# Patient Record
Sex: FEMALE | Race: WHITE | NOT HISPANIC OR LATINO | Employment: FULL TIME | URBAN - METROPOLITAN AREA
[De-identification: names, ages, dates, MRNs, and addresses within clinical notes are randomized per-mention and may not be internally consistent; named-entity substitution may affect disease eponyms.]

---

## 2023-04-19 LAB
ALANINE AMINOTRANSFERASE (SGPT) (U/L) IN SER/PLAS: 19 U/L (ref 7–45)
ALBUMIN (G/DL) IN SER/PLAS: 3.8 G/DL (ref 3.4–5)
ALKALINE PHOSPHATASE (U/L) IN SER/PLAS: 81 U/L (ref 33–136)
ANION GAP IN SER/PLAS: 13 MMOL/L (ref 10–20)
ASPARTATE AMINOTRANSFERASE (SGOT) (U/L) IN SER/PLAS: 23 U/L (ref 9–39)
BASOPHILS (10*3/UL) IN BLOOD BY AUTOMATED COUNT: 0.09 X10E9/L (ref 0–0.1)
BASOPHILS/100 LEUKOCYTES IN BLOOD BY AUTOMATED COUNT: 0.9 % (ref 0–2)
BILIRUBIN TOTAL (MG/DL) IN SER/PLAS: 0.5 MG/DL (ref 0–1.2)
C REACTIVE PROTEIN (MG/L) IN SER/PLAS: 5.19 MG/DL
CALCIUM (MG/DL) IN SER/PLAS: 9.7 MG/DL (ref 8.6–10.3)
CARBON DIOXIDE, TOTAL (MMOL/L) IN SER/PLAS: 26 MMOL/L (ref 21–32)
CHLORIDE (MMOL/L) IN SER/PLAS: 104 MMOL/L (ref 98–107)
CREATININE (MG/DL) IN SER/PLAS: 1.03 MG/DL (ref 0.5–1.05)
EOSINOPHILS (10*3/UL) IN BLOOD BY AUTOMATED COUNT: 0.31 X10E9/L (ref 0–0.7)
EOSINOPHILS/100 LEUKOCYTES IN BLOOD BY AUTOMATED COUNT: 3.2 % (ref 0–6)
ERYTHROCYTE DISTRIBUTION WIDTH (RATIO) BY AUTOMATED COUNT: 12.5 % (ref 11.5–14.5)
ERYTHROCYTE MEAN CORPUSCULAR HEMOGLOBIN CONCENTRATION (G/DL) BY AUTOMATED: 31.6 G/DL (ref 32–36)
ERYTHROCYTE MEAN CORPUSCULAR VOLUME (FL) BY AUTOMATED COUNT: 99 FL (ref 80–100)
ERYTHROCYTES (10*6/UL) IN BLOOD BY AUTOMATED COUNT: 3.76 X10E12/L (ref 4–5.2)
GFR FEMALE: 60 ML/MIN/1.73M2
GLUCOSE (MG/DL) IN SER/PLAS: 127 MG/DL (ref 74–99)
HEMATOCRIT (%) IN BLOOD BY AUTOMATED COUNT: 37.4 % (ref 36–46)
HEMOGLOBIN (G/DL) IN BLOOD: 11.8 G/DL (ref 12–16)
IMMATURE GRANULOCYTES/100 LEUKOCYTES IN BLOOD BY AUTOMATED COUNT: 0.7 % (ref 0–0.9)
LEUKOCYTES (10*3/UL) IN BLOOD BY AUTOMATED COUNT: 9.7 X10E9/L (ref 4.4–11.3)
LYMPHOCYTES (10*3/UL) IN BLOOD BY AUTOMATED COUNT: 2.81 X10E9/L (ref 1.2–4.8)
LYMPHOCYTES/100 LEUKOCYTES IN BLOOD BY AUTOMATED COUNT: 28.9 % (ref 13–44)
MONOCYTES (10*3/UL) IN BLOOD BY AUTOMATED COUNT: 0.95 X10E9/L (ref 0.1–1)
MONOCYTES/100 LEUKOCYTES IN BLOOD BY AUTOMATED COUNT: 9.8 % (ref 2–10)
NEUTROPHILS (10*3/UL) IN BLOOD BY AUTOMATED COUNT: 5.48 X10E9/L (ref 1.2–7.7)
NEUTROPHILS/100 LEUKOCYTES IN BLOOD BY AUTOMATED COUNT: 56.5 % (ref 40–80)
PLATELETS (10*3/UL) IN BLOOD AUTOMATED COUNT: 400 X10E9/L (ref 150–450)
POTASSIUM (MMOL/L) IN SER/PLAS: 4.1 MMOL/L (ref 3.5–5.3)
PROTEIN TOTAL: 7 G/DL (ref 6.4–8.2)
SODIUM (MMOL/L) IN SER/PLAS: 139 MMOL/L (ref 136–145)
UREA NITROGEN (MG/DL) IN SER/PLAS: 18 MG/DL (ref 6–23)

## 2024-04-26 ENCOUNTER — OFFICE VISIT (OUTPATIENT)
Dept: RHEUMATOLOGY | Facility: CLINIC | Age: 69
End: 2024-04-26
Payer: COMMERCIAL

## 2024-04-26 VITALS
HEIGHT: 67 IN | OXYGEN SATURATION: 98 % | WEIGHT: 136.4 LBS | DIASTOLIC BLOOD PRESSURE: 68 MMHG | SYSTOLIC BLOOD PRESSURE: 118 MMHG | BODY MASS INDEX: 21.41 KG/M2 | HEART RATE: 76 BPM | TEMPERATURE: 98.3 F

## 2024-04-26 DIAGNOSIS — M05.79 RHEUMATOID ARTHRITIS INVOLVING MULTIPLE SITES WITH POSITIVE RHEUMATOID FACTOR (MULTI): Primary | ICD-10-CM

## 2024-04-26 PROBLEM — H43.813 BILATERAL VITREOUS DETACHMENT: Status: ACTIVE | Noted: 2024-04-26

## 2024-04-26 PROBLEM — B35.1 ONYCHOMYCOSIS: Status: ACTIVE | Noted: 2020-09-16

## 2024-04-26 PROBLEM — S39.012A LUMBAR STRAIN: Status: ACTIVE | Noted: 2024-04-26

## 2024-04-26 PROBLEM — R92.2 DENSE BREASTS: Status: ACTIVE | Noted: 2024-04-26

## 2024-04-26 PROBLEM — C25.0 MALIGNANT NEOPLASM OF HEAD OF PANCREAS (MULTI): Status: ACTIVE | Noted: 2023-05-26

## 2024-04-26 PROBLEM — Z86.0100 HISTORY OF COLON POLYPS: Status: ACTIVE | Noted: 2024-04-26

## 2024-04-26 PROBLEM — R43.2 ALTERED TASTE: Status: ACTIVE | Noted: 2023-09-06

## 2024-04-26 PROBLEM — Z90.79 H/O BILATERAL SALPINGECTOMY: Status: ACTIVE | Noted: 2018-02-05

## 2024-04-26 PROBLEM — Z86.010 HISTORY OF COLON POLYPS: Status: ACTIVE | Noted: 2024-04-26

## 2024-04-26 PROBLEM — E08.9 DIABETES MELLITUS DUE TO UNDERLYING CONDITION WITHOUT COMPLICATION, WITHOUT LONG-TERM CURRENT USE OF INSULIN (MULTI): Status: ACTIVE | Noted: 2024-01-02

## 2024-04-26 PROBLEM — G62.9 NEUROPATHY: Status: ACTIVE | Noted: 2023-09-06

## 2024-04-26 PROBLEM — J06.9 RECURRENT URI (UPPER RESPIRATORY INFECTION): Status: ACTIVE | Noted: 2024-04-26

## 2024-04-26 PROBLEM — R50.9 FEVER: Status: ACTIVE | Noted: 2024-04-26

## 2024-04-26 PROBLEM — G62.0 CHEMOTHERAPY-INDUCED NEUROPATHY (MULTI): Status: ACTIVE | Noted: 2024-04-23

## 2024-04-26 PROBLEM — Z90.710 S/P ABDOMINAL HYSTERECTOMY: Status: ACTIVE | Noted: 2018-12-17

## 2024-04-26 PROBLEM — D64.9 NORMOCYTIC NORMOCHROMIC ANEMIA: Status: ACTIVE | Noted: 2023-09-06

## 2024-04-26 PROBLEM — R05.9 COUGH: Status: ACTIVE | Noted: 2024-04-26

## 2024-04-26 PROBLEM — E55.9 VITAMIN D DEFICIENCY: Status: ACTIVE | Noted: 2024-04-26

## 2024-04-26 PROBLEM — D72.821 MONOCYTOSIS: Status: ACTIVE | Noted: 2023-09-06

## 2024-04-26 PROBLEM — R74.8 ALKALINE PHOSPHATASE ELEVATION: Status: ACTIVE | Noted: 2023-09-06

## 2024-04-26 PROBLEM — R92.30 DENSE BREASTS: Status: ACTIVE | Noted: 2024-04-26

## 2024-04-26 PROBLEM — G43.109 MIGRAINE WITH AURA, NOT INTRACTABLE, WITHOUT STATUS MIGRAINOSUS: Status: ACTIVE | Noted: 2024-04-26

## 2024-04-26 PROBLEM — H35.81 MACULAR EDEMA OF LEFT EYE: Status: ACTIVE | Noted: 2024-04-26

## 2024-04-26 PROBLEM — J22 LOWER RESPIRATORY INFECTION (E.G., BRONCHITIS, PNEUMONIA, PNEUMONITIS, PULMONITIS): Status: ACTIVE | Noted: 2024-04-26

## 2024-04-26 PROBLEM — K21.9 GERD (GASTROESOPHAGEAL REFLUX DISEASE): Status: ACTIVE | Noted: 2020-09-16

## 2024-04-26 PROBLEM — H35.372 EPIRETINAL MEMBRANE (ERM) OF LEFT EYE: Status: ACTIVE | Noted: 2024-04-26

## 2024-04-26 PROBLEM — Z90.49 HISTORY OF WHIPPLE PROCEDURE: Status: ACTIVE | Noted: 2023-10-24

## 2024-04-26 PROBLEM — I10 ESSENTIAL HYPERTENSION: Status: ACTIVE | Noted: 2024-04-26

## 2024-04-26 PROBLEM — Z90.410 HISTORY OF WHIPPLE PROCEDURE: Status: ACTIVE | Noted: 2023-10-24

## 2024-04-26 PROBLEM — T45.1X5A CHEMOTHERAPY-INDUCED NEUROPATHY (MULTI): Status: ACTIVE | Noted: 2024-04-23

## 2024-04-26 PROBLEM — E78.5 DYSLIPIDEMIA: Status: ACTIVE | Noted: 2024-04-26

## 2024-04-26 PROBLEM — N28.9 ACUTE RENAL INSUFFICIENCY: Status: ACTIVE | Noted: 2024-04-26

## 2024-04-26 PROBLEM — M85.80 OSTEOPENIA, SENILE: Status: ACTIVE | Noted: 2022-10-04

## 2024-04-26 PROCEDURE — 99214 OFFICE O/P EST MOD 30 MIN: CPT | Performed by: INTERNAL MEDICINE

## 2024-04-26 PROCEDURE — 3078F DIAST BP <80 MM HG: CPT | Performed by: INTERNAL MEDICINE

## 2024-04-26 PROCEDURE — 1159F MED LIST DOCD IN RCRD: CPT | Performed by: INTERNAL MEDICINE

## 2024-04-26 PROCEDURE — 1160F RVW MEDS BY RX/DR IN RCRD: CPT | Performed by: INTERNAL MEDICINE

## 2024-04-26 PROCEDURE — 3074F SYST BP LT 130 MM HG: CPT | Performed by: INTERNAL MEDICINE

## 2024-04-26 PROCEDURE — 1036F TOBACCO NON-USER: CPT | Performed by: INTERNAL MEDICINE

## 2024-04-26 PROCEDURE — 3008F BODY MASS INDEX DOCD: CPT | Performed by: INTERNAL MEDICINE

## 2024-04-26 PROCEDURE — 1124F ACP DISCUSS-NO DSCNMKR DOCD: CPT | Performed by: INTERNAL MEDICINE

## 2024-04-26 RX ORDER — ACETAMINOPHEN 500 MG
500 TABLET ORAL EVERY 6 HOURS PRN
COMMUNITY

## 2024-04-26 RX ORDER — CICLOPIROX 7.7 MG/G
1 GEL TOPICAL
COMMUNITY

## 2024-04-26 RX ORDER — CARBOXYMETHYLCELLULOSE SODIUM 5 MG/ML
1 SOLUTION/ DROPS OPHTHALMIC AS NEEDED
COMMUNITY

## 2024-04-26 RX ORDER — OMEPRAZOLE 20 MG/1
20 CAPSULE, DELAYED RELEASE ORAL
COMMUNITY
Start: 2024-04-10

## 2024-04-26 RX ORDER — CYCLOSPORINE 0.5 MG/ML
1 EMULSION OPHTHALMIC EVERY 12 HOURS
COMMUNITY
Start: 2012-11-28

## 2024-04-26 RX ORDER — PANCRELIPASE 36000; 180000; 114000 [USP'U]/1; [USP'U]/1; [USP'U]/1
1 CAPSULE, DELAYED RELEASE PELLETS ORAL
COMMUNITY

## 2024-04-26 RX ORDER — ACETAMINOPHEN 500 MG
2000 TABLET ORAL
COMMUNITY
Start: 2024-04-23

## 2024-04-26 RX ORDER — FLUTICASONE PROPIONATE 50 MCG
1 SPRAY, SUSPENSION (ML) NASAL DAILY
COMMUNITY

## 2024-04-26 RX ORDER — HYDROXYCHLOROQUINE SULFATE 200 MG/1
300 TABLET, FILM COATED ORAL DAILY
Qty: 45 TABLET | Refills: 5 | Status: SHIPPED | OUTPATIENT
Start: 2024-04-26

## 2024-04-26 RX ORDER — AMLODIPINE BESYLATE 2.5 MG/1
2.5 TABLET ORAL DAILY
COMMUNITY
Start: 2024-01-02

## 2024-04-26 ASSESSMENT — PATIENT HEALTH QUESTIONNAIRE - PHQ9
1. LITTLE INTEREST OR PLEASURE IN DOING THINGS: NOT AT ALL
SUM OF ALL RESPONSES TO PHQ9 QUESTIONS 1 AND 2: 0
2. FEELING DOWN, DEPRESSED OR HOPELESS: NOT AT ALL

## 2024-04-26 NOTE — PATIENT INSTRUCTIONS
Recommend hydroxychloroquine 300 mg daily.  Discussed risk of retinopathy and need for periodic eye exams.  We will send a message to Dr. Mak to make sure he does not think hydroxychloroquine is contraindicated.  Do not start the medication until you hear back from us.  Follow-up in 3 months.

## 2024-04-26 NOTE — PROGRESS NOTES
Subjective   Patient ID: Beatris Ponce is a 68 y.o. female who presents for Follow-up re: RA. (Onset of symptoms was 2006-rheumatoid factor negative, Citrulline antibody 41, EMA 1:160 with negative panel), CRP 1.8, ESR 32,    HPI 67 yo F here for f/u re: RA. (Onset of symptoms was 2006-rheumatoid factor negative, Citrulline antibody 41, EMA 1:160 with negative panel), CRP 1.8, ESR 32,  She was last seen by me April 2023.    She was diagnosed with adenocarcinoma of the pancreas June 2023. She is followed by Dr. Davidson in oncology.  TREATMENT SUMMARY:  1-Neoadjuvant FOLFIRINOX x 3 months: completed 9/8/2023   2-Whipple procedure (Dr Clements, 10/6/2023), yT2N1  3-Adjuvant FOLFIRINOX: started 11/29/2023, completed 2/9/2024  4-Current treatment: surveillance    She is currently working from home.  Next CT scan is scheduled for June 2024.    She continues with Restasis, refresh drops, and refresh gel at bedtime for treatment of dry eyes.  She has been off methotrexate since June 2024.    She had eye exam 6/22 (Dr. Padgett and Dr. Mak- retina). 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  She has been getting seen every 3 months.    She had recent episodic joint pain, although she has otherwise been symptom-free since she was getting chemotherapy for pancreatic cancer.  She had a flare about 2 weeks ago, at which time her wrists, left shoulder, knees and ankles were sore and stiff.  This resolved with taking Tylenol for 2 days.  She relates that 2 days ago, she is having pain in her left elbow, right third and fourth MCP joints.  This is now feeling better as well.    She had a colonoscopy last 10/21, was found to have 4 polyps. 3-year follow-up was recommended.  Her maternal uncle had colon cancer.    DEXA 7/22: T score -1.2 left total hip, T score -1.2 left femoral neck, lumbar spine T score normal    Labs September 2022: Fasting glucose 104, cholesterol 170, HDL 46, , triglycerides 91  Labs October 2022:  "CBC normal, CMP normal except glucose 116, CRP 0.57 (normal less than 1),   Labs 1/23: CMP normal except creatinine 1.25 (GFR 48) and glucose 140, CBC normal, CRP 0.56 (less than 1)  Labs April 2023: CMP normal except glucose 127, CBC normal except hemoglobin 11.8, CRP 5.19 (normal less than 1)  Labs April 2024: CMP normal except alkaline phosphatase 534 (), AST 84 (13-35), ALT 94 (7-38), CBC normal, CA 19-9 normal, magnesium 1.9, urine microalbumin negative    Health maintenance:   -Prevnar 20 October 2022   -Pfizer COVID vaccine 3/21, 4/21, 8/21, 4/22   - BV Pfizer COVID booster 10/22    Review of Systems  General: Denies fevers or chills.  CV: Denies chest pain or palpitations.  Denies leg edema.  Lungs: Denies coughing or shortness of breath.  Skin: Denies rashes or nodules.  MS: c/o episodic joint pain as seen in [place].    Objective   Visit Vitals  /68 (BP Location: Left arm, Patient Position: Sitting, BP Cuff Size: Small adult)   Pulse 76   Temp 36.8 °C (98.3 °F)   Ht 1.695 m (5' 6.75\")   Wt 61.9 kg (136 lb 6.4 oz)   SpO2 98%   BMI 21.52 kg/m²   Smoking Status Never   BSA 1.71 m²        Physical Exam  HEENT: PERRL, EOMI  Neck: Supple, no nodes.  CV: RRR, no MGR.  Lungs: Clear, no rales or wheezes.  Abdomen: Soft, nontender. No hepatosplenomegaly.  Extremities:  No cyanosis, clubbing, or edema.  MS: Swollen: 0         Tender: 0         Patient global: 1         Evaluator global: 1          CDAI: 2 (remission)            Skin: No nodules or vasculitic lesions.      Assessment/Plan   Problem List Items Addressed This Visit             ICD-10-CM    Rheumatoid arthritis (Multi) - Primary M06.9    Relevant Medications    hydroxychloroquine (Plaquenil) 200 mg tablet    Other Relevant Orders    C-reactive protein    BMI 21.0-21.9, adult Z68.21           Seropositive rheumatoid arthritis-onset 2006 (CCP 41, RF negative, EMA 1:160 with negative panel, CRP 1.8, ESR 32)-she was on methotrexate until diagnosed " with pancreatic cancer June 2023.   She had Whipple procedure October 2023.  She completed adjuvant chemotherapy 2/24.  She has had a recent recurrence of her symptoms over the last 2 week.  Pain has been coming and going.    Review of labs done April 2024 show elevated alkaline phosphatase of 534, as well as elevated liver transaminases (AST 84 and ALT 94).  This makes medication such as methotrexate or leflunomide contraindicated.  Unfortunately she is sulfa allergic and cannot take sulfasalazine.  I believe hydroxychloroquine may be her safest option at present.    She is followed by Dr. Mak because of what she describes as a wrinkle in her retina.  We will send a message to Dr. Mak to make sure he does not believe hydroxychloroquine is contraindicated.  I warned of risk of retinopathy and need for periodic eye exams.    BMI-currently 21 and stable.    Plan:  Recommend hydroxychloroquine 300 mg daily.  Discussed risk of retinopathy and need for periodic eye exams.  We will send a message to Dr. Mak to make sure he does not think hydroxychloroquine is contraindicated.  Do not start the medication until you hear back from us.  Follow-up in 3 months.

## 2024-04-30 ENCOUNTER — TELEPHONE (OUTPATIENT)
Dept: PRIMARY CARE | Facility: CLINIC | Age: 69
End: 2024-04-30
Payer: COMMERCIAL

## 2024-04-30 NOTE — TELEPHONE ENCOUNTER
Called the [facility] office and they kept transferring me to multiple places, they told me to send an email instead with the message, I did sent the email as advised.

## 2024-04-30 NOTE — TELEPHONE ENCOUNTER
----- Message from Ivon Blanton MD sent at 4/26/2024  1:09 PM EDT -----  Regarding: possibly starting hydroxychloroquine  Please call Dr. Mak at [facility] and leave a message.  This patient has recently been treated for pancreatic cancer.  Liver enzymes are still elevated.  She is allergic to sulfa.  She has rheumatoid arthritis which is flaring.  I cannot have her resume methotrexate because of elevated liver enzymes.  I would like to start hydroxychloroquine-please make sure it is okay with Dr. Mak.  Dr. Puente

## 2024-05-01 ENCOUNTER — TELEPHONE (OUTPATIENT)
Dept: PRIMARY CARE | Facility: CLINIC | Age: 69
End: 2024-05-01
Payer: COMMERCIAL

## 2024-05-01 NOTE — TELEPHONE ENCOUNTER
Called the office, Doctor Wasn't available but the assistant gave me the email address, I did sent him an email for this patient. I included patients info and the message From Dr. Puente.

## 2024-05-01 NOTE — TELEPHONE ENCOUNTER
----- Message from Ivon Blanton MD sent at 4/30/2024 12:47 PM EDT -----  Please call Dr. Mak at [facility] and leave a message.  This patient has recently been treated for pancreatic cancer.  Liver enzymes are still elevated.  She is allergic to sulfa.   She has rheumatoid arthritis which is flaring.  I cannot have her resume methotrexate because of elevated liver enzymes.  I would like to start hydroxychloroquine-please make sure it is okay with Dr. Mak.   Dr. Puente

## 2024-05-02 ENCOUNTER — TELEPHONE (OUTPATIENT)
Dept: PRIMARY CARE | Facility: CLINIC | Age: 69
End: 2024-05-02
Payer: COMMERCIAL

## 2024-06-10 ENCOUNTER — TELEPHONE (OUTPATIENT)
Dept: PRIMARY CARE | Facility: CLINIC | Age: 69
End: 2024-06-10
Payer: COMMERCIAL

## 2024-06-10 NOTE — TELEPHONE ENCOUNTER
TANNER  Pt was advised by her pcp to let Dr. Puente know that she saw her PCP for rash on both her low legs and they have ran some blood work

## 2024-06-12 DIAGNOSIS — M19.90 ARTHRITIS: Primary | ICD-10-CM

## 2024-06-12 DIAGNOSIS — L95.9 CUTANEOUS VASCULITIS: ICD-10-CM

## 2024-06-12 NOTE — TELEPHONE ENCOUNTER
Pt called stated has a rash on both legs from knee to ankle. Pt also complaining of swelling in hands, saw PCP and stated thinks it related to RA. Pt looking to get in to see Dr. Puente next week?    Pharmacy=  [facility] in [place]    Please call pt at  [phone]    Pt also sent [employer] message with photos to Dr. Puente

## 2024-06-12 NOTE — TELEPHONE ENCOUNTER
Called pt to call me (Dayana) back to see if she can come in for the appt Dr. Puente advised on and let her know that Dr. Puente put in additional blood work.

## 2024-06-12 NOTE — TELEPHONE ENCOUNTER
----- Message from Ivon Blanton MD sent at 6/12/2024  2:05 PM EDT -----  Please offer her appointment for Friday June 21 at 8 AM. I entered order for her to do additional labs. I also entered Follow My Health message.  Dr. Puente

## 2024-06-14 ENCOUNTER — LAB (OUTPATIENT)
Dept: LAB | Facility: LAB | Age: 69
End: 2024-06-14
Payer: COMMERCIAL

## 2024-06-14 DIAGNOSIS — L95.9 CUTANEOUS VASCULITIS: ICD-10-CM

## 2024-06-14 DIAGNOSIS — M05.79 RHEUMATOID ARTHRITIS INVOLVING MULTIPLE SITES WITH POSITIVE RHEUMATOID FACTOR (MULTI): ICD-10-CM

## 2024-06-14 LAB
APPEARANCE UR: CLEAR
ASO AB SERPL-ACNC: 108 IU/ML (ref 0–250)
BILIRUB UR STRIP.AUTO-MCNC: NEGATIVE MG/DL
C3 SERPL-MCNC: 145 MG/DL (ref 87–200)
C4 SERPL-MCNC: 34 MG/DL (ref 10–50)
CENTROMERE B AB SER-ACNC: <0.2 AI
CHROMATIN AB SERPL-ACNC: <0.2 AI
COLOR UR: COLORLESS
CRP SERPL-MCNC: 0.25 MG/DL
DSDNA AB SER-ACNC: <1 IU/ML
ENA JO1 AB SER QL IA: <0.2 AI
ENA RNP AB SER IA-ACNC: 0.2 AI
ENA SCL70 AB SER QL IA: <0.2 AI
ENA SM AB SER IA-ACNC: <0.2 AI
ENA SM+RNP AB SER QL IA: <0.2 AI
ENA SS-A AB SER IA-ACNC: <0.2 AI
ENA SS-B AB SER IA-ACNC: <0.2 AI
ERYTHROCYTE [SEDIMENTATION RATE] IN BLOOD BY WESTERGREN METHOD: 8 MM/H (ref 0–30)
GLUCOSE UR STRIP.AUTO-MCNC: NORMAL MG/DL
HBV SURFACE AG SERPL QL IA: NONREACTIVE
HCV AB SER QL: NONREACTIVE
IGA SERPL-MCNC: 209 MG/DL (ref 70–400)
KETONES UR STRIP.AUTO-MCNC: NEGATIVE MG/DL
LEUKOCYTE ESTERASE UR QL STRIP.AUTO: NEGATIVE
NITRITE UR QL STRIP.AUTO: NEGATIVE
PH UR STRIP.AUTO: 6 [PH]
PROT SERPL-MCNC: 6.9 G/DL (ref 6.4–8.2)
PROT UR STRIP.AUTO-MCNC: NEGATIVE MG/DL
RBC # UR STRIP.AUTO: NEGATIVE /UL
RIBOSOMAL P AB SER-ACNC: <0.2 AI
SP GR UR STRIP.AUTO: 1
UROBILINOGEN UR STRIP.AUTO-MCNC: NORMAL MG/DL

## 2024-06-14 PROCEDURE — 86235 NUCLEAR ANTIGEN ANTIBODY: CPT

## 2024-06-14 PROCEDURE — 36415 COLL VENOUS BLD VENIPUNCTURE: CPT

## 2024-06-14 PROCEDURE — 87340 HEPATITIS B SURFACE AG IA: CPT

## 2024-06-14 PROCEDURE — 85652 RBC SED RATE AUTOMATED: CPT

## 2024-06-14 PROCEDURE — 86160 COMPLEMENT ANTIGEN: CPT

## 2024-06-14 PROCEDURE — 81003 URINALYSIS AUTO W/O SCOPE: CPT

## 2024-06-14 PROCEDURE — 84165 PROTEIN E-PHORESIS SERUM: CPT

## 2024-06-14 PROCEDURE — 86060 ANTISTREPTOLYSIN O TITER: CPT

## 2024-06-14 PROCEDURE — 86140 C-REACTIVE PROTEIN: CPT

## 2024-06-14 PROCEDURE — 86038 ANTINUCLEAR ANTIBODIES: CPT

## 2024-06-14 PROCEDURE — 86803 HEPATITIS C AB TEST: CPT

## 2024-06-14 PROCEDURE — 86225 DNA ANTIBODY NATIVE: CPT

## 2024-06-14 PROCEDURE — 82784 ASSAY IGA/IGD/IGG/IGM EACH: CPT

## 2024-06-14 PROCEDURE — 84155 ASSAY OF PROTEIN SERUM: CPT

## 2024-06-16 NOTE — PROGRESS NOTES
Subjective   Patient ID: Beatris Ponce is a 69 y.o. female who presents for re: RA. (Onset of symptoms was 2006-rheumatoid factor negative, Citrulline antibody 41, EMA 1:160 with negative panel), CRP 1.8, ESR 32.    HPI 69 yo F here for f/u re: RA. (Onset of symptoms was 2006-rheumatoid factor negative, Citrulline antibody 41, EMA 1:160 with negative panel), CRP 1.8, ESR 32,  She started  mg daily 4/26/24.    She developed rash on her distal lower extremities 6/09/24.   She saw a nurse practitioner in her primary care office.  The nurse did not think the lesions were palpable.  The lesions were only located on her anterior distal legs, distal to the knees.  There was no associated itching or pain.    She denied associated symptoms such as fever, sore throat.  She does have some chronic joint pain related to her rheumatoid arthritis.  Last week she had pain in the third MCP joint bilaterally.  She needed to remove her wedding band due to finger swelling.    She has chronic diarrhea since her Whipple procedure October 2023.  She has chronic numbness in her feet since she completed her chemotherapy February 2024.    She notes that the rash is already resolving.    She has follow-up scheduled with her oncologist and her surgeon July 2, 2024.  She will be getting repeat CT scans at the end of June 2024.    She was diagnosed with adenocarcinoma of the pancreas June 2023. She is followed by Dr. Davidson in oncology.  TREATMENT SUMMARY:  1-Neoadjuvant FOLFIRINOX x 3 months: completed 9/8/2023   2-Whipple procedure (Dr Clements, 10/6/2023), yT2N1  3-Adjuvant FOLFIRINOX: started 11/29/2023, completed 2/9/2024  4-Current treatment: surveillance    She is currently working from home.  Next CT scan is scheduled for June 2024.    She continues with Restasis, refresh drops, and refresh gel at bedtime for treatment of dry eyes.  She has been off methotrexate since June 2024.    She had eye exam 6/22 (Dr. Padgett and Dr. Mak-  retina). 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  She has been getting seen every 3 months.    She had a colonoscopy last 10/21, was found to have 4 polyps. 3-year follow-up was recommended.  Her maternal uncle had colon cancer.    DEXA 7/22: T score -1.2 left total hip, T score -1.2 left femoral neck, lumbar spine T score normal    Labs September 2022: Fasting glucose 104, cholesterol 170, HDL 46, , triglycerides 91  Labs October 2022: CBC normal, CMP normal except glucose 116, CRP 0.57 (normal less than 1),   Labs 1/23: CMP normal except creatinine 1.25 (GFR 48) and glucose 140, CBC normal, CRP 0.56 (less than 1)  Labs April 2023: CMP normal except glucose 127, CBC normal except hemoglobin 11.8, CRP 5.19 (normal less than 1)  Labs April 2024: CMP normal except alkaline phosphatase 534 (), AST 84 (13-35), ALT 94 (7-38), CBC normal, CA 19-9 normal, magnesium 1.9, urine microalbumin negative  Labs June 2024: ESR 8, CRP 0.25 (normal less than 1), hep B surface antigen negative, hep B surface antibody nonreactive, EMA pending, GIULIANA panel negative, C3 and C4 normal, IgA normal, urinalysis normal, CBC normal except hemoglobin 11.4 hematocrit 34.9,, CMP normal except AST 42 (normal 13-35), ALT 50 (normal 7-38), alkaline phosphatase 308 (normal 34-1 23), SPEP pending, ASO titer negative    Health maintenance:   -Prevnar 20 October 2022   -Pfizer COVID vaccine 3/21, 4/21, 8/21, 4/22   - BV Pfizer COVID booster 10/22    Review of Systems  General: Denies fevers or chills.  CV: Denies chest pain or palpitations.  Denies leg edema.  Lungs: Denies coughing or shortness of breath.  Skin: Had lower extremity rash which started June 9, 2024.  See history of present illness for details.  MS: c/o episodic joint pain as seen in [place].    Objective   Visit Vitals  Smoking Status Never    Visit Vitals  /64 (BP Location: Left arm, Patient Position: Sitting, BP Cuff Size: Large adult)   Pulse 79   Temp 36.7 °C  "(98 °F)   Ht 1.695 m (5' 6.75\")   Wt 62.9 kg (138 lb 9.6 oz)   SpO2 97%   BMI 21.87 kg/m²   Smoking Status Never   BSA 1.72 m²        Physical Exam  HEENT: PERRL, EOMI  Neck: Supple, no nodes.  CV: RRR, no MGR.  Lungs: Clear, no rales or wheezes.  Abdomen: Soft, nontender. No hepatosplenomegaly.  Extremities:  No cyanosis, clubbing, or edema.  MS: Swollen: 0         Tender: 0         Patient global: 1         Evaluator global: 1          CDAI: 2 (remission)            Skin: Resolving rash on the distal lower extremities.           Rash is currently faint and nonpalpable.           No involvement of volar surface of hands or plantar surface of feet.  No periungual lesions seen.           Livedoid appearance to the skin on her arms.  Faint livedoid appearance to her posterior legs.      Assessment/Plan     Problem List Items Addressed This Visit             ICD-10-CM    Rheumatoid arthritis (Multi) M06.9    History of pancreatic cancer Z85.07    Rash - Primary R21    Relevant Orders    ANCA-Associated Vasculitis Profile (ANCA,MPO,PR3)    Cryoglobulin             Seropositive rheumatoid arthritis-onset 2006 (CCP 41, RF negative, EMA 1:160 with negative panel, CRP 1.8, ESR 32)-she was on methotrexate until diagnosed with pancreatic cancer June 2023.   She had Whipple procedure October 2023.  She completed adjuvant chemotherapy 2/24.  She started hydroxychloroquine the first week of May 2024.    Rash-started June 9, 2024 on distal lower extremities.  She sent me photographs of the rash.  It did not appear to be a drug rash.  Her nurse practitioner did not think the rash was palpable, although it looks from the photograph like it could have been vasculitic.  The rash is already resolving.  Etiology of the rash at this point is not known.    BMI-currently 21 and stable.    Plan:  Check cryoglobulins and ANCA.  Continue hydroxychloroquine.  Follow-up July 2024 as scheduled.    "

## 2024-06-17 ENCOUNTER — APPOINTMENT (OUTPATIENT)
Dept: RHEUMATOLOGY | Facility: CLINIC | Age: 69
End: 2024-06-17
Payer: COMMERCIAL

## 2024-06-17 VITALS
HEART RATE: 79 BPM | TEMPERATURE: 98 F | OXYGEN SATURATION: 97 % | SYSTOLIC BLOOD PRESSURE: 106 MMHG | HEIGHT: 67 IN | WEIGHT: 138.6 LBS | DIASTOLIC BLOOD PRESSURE: 64 MMHG | BODY MASS INDEX: 21.75 KG/M2

## 2024-06-17 DIAGNOSIS — Z85.07 HISTORY OF PANCREATIC CANCER: ICD-10-CM

## 2024-06-17 DIAGNOSIS — R21 RASH: Primary | ICD-10-CM

## 2024-06-17 DIAGNOSIS — M05.79 RHEUMATOID ARTHRITIS INVOLVING MULTIPLE SITES WITH POSITIVE RHEUMATOID FACTOR (MULTI): ICD-10-CM

## 2024-06-17 PROBLEM — H04.129 TEAR FILM INSUFFICIENCY: Status: ACTIVE | Noted: 2024-06-17

## 2024-06-17 PROBLEM — Z86.19 HISTORY OF HERPES ZOSTER: Status: ACTIVE | Noted: 2024-06-17

## 2024-06-17 PROBLEM — R19.5 LOOSE STOOLS: Status: ACTIVE | Noted: 2024-06-05

## 2024-06-17 PROBLEM — J06.9 RECURRENT URI (UPPER RESPIRATORY INFECTION): Status: RESOLVED | Noted: 2024-04-26 | Resolved: 2024-06-17

## 2024-06-17 PROBLEM — K86.89 MASS OF PANCREAS (HHS-HCC): Status: ACTIVE | Noted: 2024-06-17

## 2024-06-17 PROCEDURE — 1160F RVW MEDS BY RX/DR IN RCRD: CPT | Performed by: INTERNAL MEDICINE

## 2024-06-17 PROCEDURE — 1159F MED LIST DOCD IN RCRD: CPT | Performed by: INTERNAL MEDICINE

## 2024-06-17 PROCEDURE — 99214 OFFICE O/P EST MOD 30 MIN: CPT | Performed by: INTERNAL MEDICINE

## 2024-06-17 PROCEDURE — 3008F BODY MASS INDEX DOCD: CPT | Performed by: INTERNAL MEDICINE

## 2024-06-17 PROCEDURE — 3078F DIAST BP <80 MM HG: CPT | Performed by: INTERNAL MEDICINE

## 2024-06-17 PROCEDURE — 3074F SYST BP LT 130 MM HG: CPT | Performed by: INTERNAL MEDICINE

## 2024-06-17 PROCEDURE — 1036F TOBACCO NON-USER: CPT | Performed by: INTERNAL MEDICINE

## 2024-06-17 ASSESSMENT — PATIENT HEALTH QUESTIONNAIRE - PHQ9
2. FEELING DOWN, DEPRESSED OR HOPELESS: NOT AT ALL
1. LITTLE INTEREST OR PLEASURE IN DOING THINGS: NOT AT ALL
SUM OF ALL RESPONSES TO PHQ9 QUESTIONS 1 AND 2: 0

## 2024-06-18 ENCOUNTER — LAB (OUTPATIENT)
Dept: LAB | Facility: LAB | Age: 69
End: 2024-06-18
Payer: COMMERCIAL

## 2024-06-18 DIAGNOSIS — R21 RASH: ICD-10-CM

## 2024-06-18 DIAGNOSIS — L95.9 CUTANEOUS VASCULITIS: ICD-10-CM

## 2024-06-18 LAB
ALBUMIN: 4.1 G/DL (ref 3.4–5)
ALPHA 1 GLOBULIN: 0.4 G/DL (ref 0.2–0.6)
ALPHA 2 GLOBULIN: 0.8 G/DL (ref 0.4–1.1)
ANA SER QL HEP2 SUBST: NEGATIVE
BETA GLOBULIN: 0.9 G/DL (ref 0.5–1.2)
GAMMA GLOBULIN: 0.8 G/DL (ref 0.5–1.4)
PATH REVIEW-SERUM PROTEIN ELECTROPHORESIS: NORMAL
PROTEIN ELECTROPHORESIS COMMENT: NORMAL

## 2024-06-18 PROCEDURE — 82595 ASSAY OF CRYOGLOBULIN: CPT

## 2024-06-18 PROCEDURE — 36415 COLL VENOUS BLD VENIPUNCTURE: CPT

## 2024-06-18 PROCEDURE — 86036 ANCA SCREEN EACH ANTIBODY: CPT

## 2024-06-18 PROCEDURE — 83516 IMMUNOASSAY NONANTIBODY: CPT

## 2024-06-21 ENCOUNTER — APPOINTMENT (OUTPATIENT)
Dept: RHEUMATOLOGY | Facility: CLINIC | Age: 69
End: 2024-06-21
Payer: COMMERCIAL

## 2024-06-21 LAB
ANCA AB PATTERN SER IF-IMP: NORMAL
ANCA IGG TITR SER IF: NORMAL {TITER}
MYELOPEROXIDASE AB SER-ACNC: 0 AU/ML (ref 0–19)
PROTEINASE3 AB SER-ACNC: 0 AU/ML (ref 0–19)

## 2024-06-23 LAB — CRYOGLOB SER QL: NORMAL

## 2024-07-03 DIAGNOSIS — M06.9 RHEUMATOID ARTHRITIS INVOLVING MULTIPLE SITES, UNSPECIFIED WHETHER RHEUMATOID FACTOR PRESENT (MULTI): Primary | ICD-10-CM

## 2024-07-12 ENCOUNTER — HOSPITAL ENCOUNTER (OUTPATIENT)
Dept: RADIOLOGY | Facility: CLINIC | Age: 69
End: 2024-07-12
Payer: COMMERCIAL

## 2024-07-12 ENCOUNTER — HOSPITAL ENCOUNTER (OUTPATIENT)
Dept: RADIOLOGY | Facility: CLINIC | Age: 69
Discharge: HOME | End: 2024-07-12
Payer: COMMERCIAL

## 2024-07-12 VITALS — HEIGHT: 67 IN | WEIGHT: 138 LBS | BODY MASS INDEX: 21.66 KG/M2

## 2024-07-12 DIAGNOSIS — Z12.31 ENCOUNTER FOR SCREENING MAMMOGRAM FOR MALIGNANT NEOPLASM OF BREAST: ICD-10-CM

## 2024-07-12 PROCEDURE — 77067 SCR MAMMO BI INCL CAD: CPT

## 2024-07-16 ENCOUNTER — APPOINTMENT (OUTPATIENT)
Dept: SURGICAL ONCOLOGY | Facility: CLINIC | Age: 69
End: 2024-07-16
Payer: COMMERCIAL

## 2024-07-22 ENCOUNTER — LAB (OUTPATIENT)
Dept: LAB | Facility: LAB | Age: 69
End: 2024-07-22
Payer: COMMERCIAL

## 2024-07-22 DIAGNOSIS — M06.9 RHEUMATOID ARTHRITIS INVOLVING MULTIPLE SITES, UNSPECIFIED WHETHER RHEUMATOID FACTOR PRESENT (MULTI): ICD-10-CM

## 2024-07-22 LAB
ALBUMIN SERPL BCP-MCNC: 3.9 G/DL (ref 3.4–5)
ALP SERPL-CCNC: 218 U/L (ref 33–136)
ALT SERPL W P-5'-P-CCNC: 36 U/L (ref 7–45)
ANION GAP SERPL CALC-SCNC: 14 MMOL/L (ref 10–20)
AST SERPL W P-5'-P-CCNC: 44 U/L (ref 9–39)
BASOPHILS # BLD AUTO: 0.04 X10*3/UL (ref 0–0.1)
BASOPHILS NFR BLD AUTO: 0.7 %
BILIRUB SERPL-MCNC: 0.4 MG/DL (ref 0–1.2)
BUN SERPL-MCNC: 15 MG/DL (ref 6–23)
CALCIUM SERPL-MCNC: 9.3 MG/DL (ref 8.6–10.6)
CHLORIDE SERPL-SCNC: 106 MMOL/L (ref 98–107)
CO2 SERPL-SCNC: 27 MMOL/L (ref 21–32)
CREAT SERPL-MCNC: 1.06 MG/DL (ref 0.5–1.05)
CRP SERPL-MCNC: <0.1 MG/DL
EGFRCR SERPLBLD CKD-EPI 2021: 58 ML/MIN/1.73M*2
EOSINOPHIL # BLD AUTO: 0.29 X10*3/UL (ref 0–0.7)
EOSINOPHIL NFR BLD AUTO: 5.2 %
ERYTHROCYTE [DISTWIDTH] IN BLOOD BY AUTOMATED COUNT: 13.4 % (ref 11.5–14.5)
GLUCOSE SERPL-MCNC: 131 MG/DL (ref 74–99)
HCT VFR BLD AUTO: 33.7 % (ref 36–46)
HGB BLD-MCNC: 11 G/DL (ref 12–16)
IMM GRANULOCYTES # BLD AUTO: 0.02 X10*3/UL (ref 0–0.7)
IMM GRANULOCYTES NFR BLD AUTO: 0.4 % (ref 0–0.9)
LYMPHOCYTES # BLD AUTO: 1.78 X10*3/UL (ref 1.2–4.8)
LYMPHOCYTES NFR BLD AUTO: 31.6 %
MCH RBC QN AUTO: 30.7 PG (ref 26–34)
MCHC RBC AUTO-ENTMCNC: 32.6 G/DL (ref 32–36)
MCV RBC AUTO: 94 FL (ref 80–100)
MONOCYTES # BLD AUTO: 0.57 X10*3/UL (ref 0.1–1)
MONOCYTES NFR BLD AUTO: 10.1 %
NEUTROPHILS # BLD AUTO: 2.93 X10*3/UL (ref 1.2–7.7)
NEUTROPHILS NFR BLD AUTO: 52 %
NRBC BLD-RTO: 0 /100 WBCS (ref 0–0)
PLATELET # BLD AUTO: 225 X10*3/UL (ref 150–450)
POTASSIUM SERPL-SCNC: 4.5 MMOL/L (ref 3.5–5.3)
PROT SERPL-MCNC: 6.2 G/DL (ref 6.4–8.2)
RBC # BLD AUTO: 3.58 X10*6/UL (ref 4–5.2)
SODIUM SERPL-SCNC: 142 MMOL/L (ref 136–145)
WBC # BLD AUTO: 5.6 X10*3/UL (ref 4.4–11.3)

## 2024-07-22 PROCEDURE — 86140 C-REACTIVE PROTEIN: CPT

## 2024-07-22 PROCEDURE — 36415 COLL VENOUS BLD VENIPUNCTURE: CPT

## 2024-07-22 PROCEDURE — 85025 COMPLETE CBC W/AUTO DIFF WBC: CPT

## 2024-07-22 PROCEDURE — 80053 COMPREHEN METABOLIC PANEL: CPT

## 2024-07-26 ENCOUNTER — APPOINTMENT (OUTPATIENT)
Dept: RHEUMATOLOGY | Facility: CLINIC | Age: 69
End: 2024-07-26
Payer: COMMERCIAL

## 2024-07-26 VITALS
DIASTOLIC BLOOD PRESSURE: 76 MMHG | TEMPERATURE: 97.1 F | SYSTOLIC BLOOD PRESSURE: 106 MMHG | WEIGHT: 138 LBS | BODY MASS INDEX: 21.66 KG/M2 | OXYGEN SATURATION: 98 % | HEART RATE: 78 BPM | HEIGHT: 67 IN

## 2024-07-26 DIAGNOSIS — M05.79 RHEUMATOID ARTHRITIS INVOLVING MULTIPLE SITES WITH POSITIVE RHEUMATOID FACTOR (MULTI): Primary | ICD-10-CM

## 2024-07-26 PROCEDURE — 1159F MED LIST DOCD IN RCRD: CPT | Performed by: INTERNAL MEDICINE

## 2024-07-26 PROCEDURE — 3078F DIAST BP <80 MM HG: CPT | Performed by: INTERNAL MEDICINE

## 2024-07-26 PROCEDURE — 99214 OFFICE O/P EST MOD 30 MIN: CPT | Performed by: INTERNAL MEDICINE

## 2024-07-26 PROCEDURE — 3008F BODY MASS INDEX DOCD: CPT | Performed by: INTERNAL MEDICINE

## 2024-07-26 PROCEDURE — 1160F RVW MEDS BY RX/DR IN RCRD: CPT | Performed by: INTERNAL MEDICINE

## 2024-07-26 PROCEDURE — 3074F SYST BP LT 130 MM HG: CPT | Performed by: INTERNAL MEDICINE

## 2024-07-26 PROCEDURE — 1036F TOBACCO NON-USER: CPT | Performed by: INTERNAL MEDICINE

## 2024-07-26 RX ORDER — OMEPRAZOLE 20 MG/1
20 CAPSULE, DELAYED RELEASE ORAL
Qty: 60 CAPSULE | Refills: 2 | Status: SHIPPED | OUTPATIENT
Start: 2024-07-26

## 2024-07-26 ASSESSMENT — PATIENT HEALTH QUESTIONNAIRE - PHQ9
SUM OF ALL RESPONSES TO PHQ9 QUESTIONS 1 AND 2: 0
1. LITTLE INTEREST OR PLEASURE IN DOING THINGS: NOT AT ALL
2. FEELING DOWN, DEPRESSED OR HOPELESS: NOT AT ALL

## 2024-07-26 NOTE — PROGRESS NOTES
Subjective   Patient ID: Beatris Ponce is a 69 y.o. female who presents for re: RA. (Onset of symptoms was 2006-rheumatoid factor negative, Citrulline antibody 41, EMA 1:160 with negative panel), CRP 1.8, ESR 32.    HPI 68 yo F here for f/u re: RA. (Onset of symptoms was 2006-rheumatoid factor negative, Citrulline antibody 41, EMA 1:160 with negative panel), CRP 1.8, ESR 32,  She started  mg daily 4/26/24.    She has not had joint pain in 3 weeks.    She c/o numbness in feet- related to chemotherapy.    She had a tooth extracted- will get implant in 3 months.    She developed rash on her distal lower extremities 6/09/24.   She saw a nurse practitioner in her primary care office.  The nurse did not think the lesions were palpable.  The lesions were only located on her anterior distal legs, distal to the knees.  There was no associated itching or pain.  Rash resolved without treatment.  ANCA and cryoglobulins were negative 6/28, urinalysis was also normal.      She denied associated symptoms such as fever, sore throat.  She does have some chronic joint pain related to her rheumatoid arthritis.  Last week she had pain in the third MCP joint bilaterally.  She needed to remove her wedding band due to finger swelling.    She has chronic diarrhea since her Whipple procedure October 2023.  She has chronic numbness in her feet since she completed her chemotherapy February 2024.  She gets occasional loose stool. They are trying to wean creon.  She also takes omeprazole 20 mh 2x daily for gerd.  Colonoscopy is getting done next week.  CT of abdomen/ pelvis, and chest CT were without change 6/24/24.    She haD follow-up scheduled with her oncologist and her surgeon July 2, 2024.  CT scans at the end of June 2024 showed no change.    She was diagnosed with adenocarcinoma of the pancreas June 2023. She is followed by Dr. Davidson in oncology.  TREATMENT SUMMARY:  1-Neoadjuvant FOLFIRINOX x 3 months: completed 9/8/2023    2-Whipple procedure (Dr Clements, 10/6/2023), yT2N1  3-Adjuvant FOLFIRINOX: started 11/29/2023, completed 2/9/2024  4-Current treatment: surveillance    She continues with Restasis, refresh drops, and refresh gel at bedtime for treatment of dry eyes.  She has been off methotrexate since June 2023.    She had eye exam 6/22 (Dr. Padgett and Dr. Mak- retina). 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  She has been getting seen every 3 months.    She had a colonoscopy last 10/21, was found to have 4 polyps. 3-year follow-up was recommended.  Her maternal uncle had colon cancer.    DEXA 7/22: T score -1.2 left total hip, T score -1.2 left femoral neck, lumbar spine T score normal    Labs September 2022: Fasting glucose 104, cholesterol 170, HDL 46, , triglycerides 91  Labs October 2022: CBC normal, CMP normal except glucose 116, CRP 0.57 (normal less than 1),   Labs 1/23: CMP normal except creatinine 1.25 (GFR 48) and glucose 140, CBC normal, CRP 0.56 (less than 1)  Labs April 2023: CMP normal except glucose 127, CBC normal except hemoglobin 11.8, CRP 5.19 (normal less than 1)  Labs April 2024: CMP normal except alkaline phosphatase 534 (), AST 84 (13-35), ALT 94 (7-38), CBC normal, CA 19-9 normal, magnesium 1.9, urine microalbumin negative  Labs June 2024: ESR 8, CRP 0.25 (normal less than 1), hep B surface antigen negative, hep B surface antibody nonreactive, EMA pending, GIULIANA panel negative, C3 and C4 normal, IgA normal, urinalysis normal, CBC normal except hemoglobin 11.4 hematocrit 34.9,, CMP normal except AST 42 (normal 13-35), ALT 50 (normal 7-38), alkaline phosphatase 308 (normal 34-1 23), SPEP normal,  ASO titer negative, cryos negative, ANCA negative  Labs 7/24: CBC normal except hemglobin 11 and hematocrit 33.7, CMP normal except alkaline phosphatase 218, AST 44 (9-39), creatinine 1.96 (GFR 58), CRP less than 0.1 (less than 1),     Health maintenance:   -Prevnar 20 October 2022   -Pfizer  "COVID vaccine 3/21, 4/21, 8/21, 4/22   - BV Pfizer COVID booster 10/22    Review of Systems  General: Denies fevers or chills.  CV: Denies chest pain or palpitations.  Denies leg edema.  Lungs: Denies coughing or shortness of breath.  Skin: Denies rashes or nodules.  MS: Denies joint pain or stiffness.    Objective   Visit Vitals  /76 (BP Location: Left arm, Patient Position: Sitting, BP Cuff Size: Adult)   Pulse 78   Temp 36.2 °C (97.1 °F) (Skin)   Ht 1.702 m (5' 7\")   Wt 62.6 kg (138 lb)   SpO2 98%   BMI 21.61 kg/m²   OB Status Postmenopausal   Smoking Status Never   BSA 1.72 m²        Physical Exam  HEENT: PERRL, EOMI  Neck: Supple, no nodes.  CV: RRR, no MGR.  Lungs: Clear, no rales or wheezes.  Abdomen: Soft, nontender. No hepatosplenomegaly.  Extremities:  No cyanosis, clubbing, or edema.  MS: Swollen: 0         Tender: 0         Patient global: 1         Evaluator global: 1          CDAI: 2 (remission)            Skin: No rashes or nodules.    Assessment/Plan   Problem List Items Addressed This Visit             ICD-10-CM    Rheumatoid arthritis (Multi) - Primary M06.9    BMI 21.0-21.9, adult Z68.21         Seropositive rheumatoid arthritis-onset 2006 (CCP 41, RF negative, EMA 1:160 with negative panel, CRP 1.8, ESR 32)-she was on methotrexate until diagnosed with pancreatic cancer June 2023.   She had Whipple procedure October 2023.  She completed adjuvant chemotherapy 2/24.  She started hydroxychloroquine the first week of May 2024.    Rash-started June 9, 2024 on distal lower extremities.  She sent me photographs of the rash.  It did not appear to be a drug rash.  Her nurse practitioner did not think the rash was palpable, although it looks from the photograph like it could have been vasculitic.  Rash resolved without treatment.    BMI-currently 21 and stable.    Plan:  Continue same medication.  Follow up in 3-4 months.      "

## 2024-08-26 NOTE — PROGRESS NOTES
Beatris Ponce female   1955 69 y.o.   [id number]           [facility]  Beatris Ponce is a 69 y.o.   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 2005 right breast excisional biopsy indicate atypical ductal hyperplasia (ADH). She has history fibrocystic changes and previous breast biopsies, right axillary lymph node biopsy dating back to . She declines endocrine therapy & breast MRI in the past. She has severely claustrophobic. She has no family history of breast cancer.     2015 she had total abdominal hysterectomy with bilateral salpingo-oophorectomy for large uterine fibroids. She takes methotrexate for rheumatoid arthritis.    Since last visit she was diagnosed 2023 with T2N1 adenocarcinoma of the pancreas S/p whipple procedure on 10/6/23. Adjuvant FOLFIRINOX: started 2023, completed 2024     BREAST IMAGIN2024 bilateral screening mammogram indicates BI-RADS Category 1     REPRODUCTIVE HISTORY: menarche age 16, nullipara, menopause age 52, no HRT, 2 breast biopsies, 1 with ADH, extremely dense breast tissue     FAMILY CANCER HISTORY:   Mother: Nonmelanoma skin cancer  Father: Nonmelanoma skin cancer  Maternal uncle: Colon cancer    REVIEW OF SYSTEMS    Constitutional:  Negative for appetite change, fatigue, fever and unexpected weight change.   HENT:  Negative for ear pain, hearing loss, nosebleeds, sore throat and trouble swallowing.    Eyes:  Negative for discharge, itching and visual disturbance.   Respiratory:  Negative for cough, chest tightness and shortness of breath.    Cardiovascular:  Negative for chest pain, palpitations and leg swelling.   Breast: as indicated in HPI  Gastrointestinal:  Negative for abdominal pain, constipation, diarrhea and nausea.   Endocrine: Negative for cold intolerance and heat intolerance.   Genitourinary:  Negative for dysuria, frequency, hematuria, pelvic pain and vaginal  bleeding.   Musculoskeletal:  Negative for arthralgias, back pain, gait problem, joint swelling and myalgias.   Skin:  Negative for color change and rash.   Allergic/Immunologic: Negative for environmental allergies and food allergies.   Neurological:  Negative for dizziness, tremors, speech difficulty, weakness, numbness and headaches.   Hematological:  Does not bruise/bleed easily.   Psychiatric/Behavioral:  Negative for agitation, dysphoric mood and sleep disturbance. The patient is not nervous/anxious.         MEDICATIONS  Current Outpatient Medications   Medication Instructions    acetaminophen (TYLENOL) 500 mg, oral, Every 6 hours PRN    amLODIPine (NORVASC) 2.5 mg, oral, Daily    calcium carbonate-mag hydroxid 1,000-200 mg tablet,chewable 1,000 mg, oral, Daily PRN    carboxymethylcellulose (Refresh Plus) 0.5 % ophthalmic solution 1 drop, Both Eyes, As needed    cholecalciferol (VITAMIN D-3) 2,000 Units, oral, Daily RT    ciclopirox (Loprox) 0.77 % gel 1 Application, Topical, Daily RT    fluticasone (Flonase) 50 mcg/actuation nasal spray 1 spray, Each Nostril, Daily, Shake gently. Before first use, prime pump. After use, clean tip and replace cap.    hydroxychloroquine (PLAQUENIL) 300 mg, oral, Daily    multivitamin/iron/folic acid (CENTRUM WOMEN ORAL) 1 tablet, oral, Daily    omeprazole (PRILOSEC) 20 mg, oral, 2 times daily before meals    pancrelipase, Lip-Prot-Amyl, (Creon) 36,000-114,000- 180,000 unit capsule,delayed release(DR/EC) capsule 1 capsule, oral, 3 times daily (morning, midday, late afternoon)    Restasis 0.05 % ophthalmic emulsion 1 drop, ophthalmic (eye), Every 12 hours    vit A/vit C/vit E/zinc/copper (PRESERVISION AREDS ORAL) 1 capsule, oral, 2 times daily        ALLERGIES  Allergies   Allergen Reactions    Penicillins Rash     Skin test negative 3/20/14.  Oral challenge is deferred   rash    Skin test negative 3/20/14.  Oral challenge is deferred      3.14.17-As last option preferred by pt     Sulfa (Sulfonamide Antibiotics) Swelling     hives        SOCIAL HISTORY    No family history on file.      Social History     Tobacco Use    Smoking status: Never    Smokeless tobacco: Never   Substance Use Topics    Alcohol use: Never        VITALS  There were no vitals filed for this visit.     PHYSICAL EXAM  Patient is alert and oriented in a relaxed appropriate and very pleasant mood. Her gait is steady. Her hand grasps are equal. Sclera clear. Her breasts are full and slightly asymmetrical, right larger than left. She has a well-healed incision in the central lateral breast from her previous excisional biopsy. There is a slight divot of tissue void from her surgery however I am unable to appreciate any palpable abnormalities or discrete nodules. Right axilla with well-healed incision from previous axillary biopsy. Her skin and nipples are normal. Her right breast is unremarkable soft tissue throughout. There is no cervical, supraclavicular, or axillary lymphadenopathy. Heart normal S1-S2 appreciated. Lungs clear to auscultation bilaterally. Abdomen soft, nontender, no hepatosplenomegaly       Physical Exam     IMAGING    Time was spent viewing digital images of the radiology testing with the patient. I explained the results in depth, along with suggested explanation for follow up recommendations based on the testing results.      RISK PROFILE          ORDERS  Orders Placed This Encounter   Procedures    BI mammo bilateral screening tomosynthesis     Standing Status:   Future     Standing Expiration Date:   10/26/2025     Order Specific Question:   Perform a breast ultrasound if clinically indicated by Radiologist?     Answer:   Yes     Order Specific Question:   Previous Mamm performed at  location?     Answer:   Yes     Order Specific Question:   Reason for exam:     Answer:   screening     Order Specific Question:   Radiologist to Determine Optimal Study     Answer:   Yes     Order Specific Question:    Release result to [employer]     Answer:   Immediate     Order Specific Question:   Is this exam part of a Research Study? If Yes, link this order to the research study     Answer:   No           ASSESSMENT/PLAN  1. Encounter for screening mammogram for malignant neoplasm of breast  BI mammo bilateral screening tomosynthesis           No follow-ups on file.    HIGH RISK PLAN  Yearly mammogram with digital breast tomosynthesis  Twice yearly clinical breast examinations  Breast MRI (to schedule call [phone]) is an optional screening tool  Monthly self breast examinations &/or regular self breast awareness  Vitamin D3 2000 IU/daily (over the counter) unless your PCP recommends you take a specific dose  Exercise 3-4 times per week for 45-60 minutes  Limit alcohol to 3-4 drinks per week if you are menopausal  Eat healthy low-fat diet with lots of vegetable & fruits  Risk models indicate personal risk of breast cancer in the next 5 years and lifetime (age 85-90):  Breast Cancer Risk Assessment Tool (Rosa): 5-year risk 4.9% (average 2%), lifetime risk 17.4% (average 7.8%).   Safia: 5-year risk 13.1% (average 1.8%), lifetime risk 35.1%, (average 5.8%)      Elizabeth Anglin, [facility]

## 2024-08-30 ENCOUNTER — APPOINTMENT (OUTPATIENT)
Dept: SURGICAL ONCOLOGY | Facility: CLINIC | Age: 69
End: 2024-08-30
Payer: COMMERCIAL

## 2024-08-30 DIAGNOSIS — Z12.31 ENCOUNTER FOR SCREENING MAMMOGRAM FOR MALIGNANT NEOPLASM OF BREAST: Primary | ICD-10-CM

## 2024-09-26 DIAGNOSIS — M05.79 RHEUMATOID ARTHRITIS INVOLVING MULTIPLE SITES WITH POSITIVE RHEUMATOID FACTOR (MULTI): ICD-10-CM

## 2024-09-26 RX ORDER — HYDROXYCHLOROQUINE SULFATE 200 MG/1
TABLET, FILM COATED ORAL
Qty: 45 TABLET | Refills: 5 | Status: SHIPPED | OUTPATIENT
Start: 2024-09-26

## 2024-10-10 ENCOUNTER — TELEPHONE (OUTPATIENT)
Dept: SURGICAL ONCOLOGY | Facility: CLINIC | Age: 69
End: 2024-10-10
Payer: COMMERCIAL

## 2024-10-10 ASSESSMENT — ENCOUNTER SYMPTOMS
LOSS OF SENSATION IN FEET: 1
DEPRESSION: 0
OCCASIONAL FEELINGS OF UNSTEADINESS: 0

## 2024-10-10 NOTE — TELEPHONE ENCOUNTER
Called patient in regard to upcoming breast appointment. Left message and phone number on voice mail to return my call.

## 2024-10-12 ENCOUNTER — OFFICE VISIT (OUTPATIENT)
Dept: SURGICAL ONCOLOGY | Facility: CLINIC | Age: 69
End: 2024-10-12
Payer: COMMERCIAL

## 2024-10-12 VITALS
BODY MASS INDEX: 21.75 KG/M2 | HEIGHT: 67 IN | TEMPERATURE: 98.5 F | WEIGHT: 138.56 LBS | SYSTOLIC BLOOD PRESSURE: 149 MMHG | DIASTOLIC BLOOD PRESSURE: 79 MMHG

## 2024-10-12 DIAGNOSIS — Z12.39 BREAST CANCER SCREENING, HIGH RISK PATIENT: ICD-10-CM

## 2024-10-12 DIAGNOSIS — Z12.31 ENCOUNTER FOR SCREENING MAMMOGRAM FOR MALIGNANT NEOPLASM OF BREAST: Primary | ICD-10-CM

## 2024-10-12 PROCEDURE — 1160F RVW MEDS BY RX/DR IN RCRD: CPT | Performed by: NURSE PRACTITIONER

## 2024-10-12 PROCEDURE — 99214 OFFICE O/P EST MOD 30 MIN: CPT | Performed by: NURSE PRACTITIONER

## 2024-10-12 PROCEDURE — 3077F SYST BP >= 140 MM HG: CPT | Performed by: NURSE PRACTITIONER

## 2024-10-12 PROCEDURE — 3008F BODY MASS INDEX DOCD: CPT | Performed by: NURSE PRACTITIONER

## 2024-10-12 PROCEDURE — 1159F MED LIST DOCD IN RCRD: CPT | Performed by: NURSE PRACTITIONER

## 2024-10-12 PROCEDURE — 3078F DIAST BP <80 MM HG: CPT | Performed by: NURSE PRACTITIONER

## 2024-10-19 ENCOUNTER — APPOINTMENT (OUTPATIENT)
Dept: SURGICAL ONCOLOGY | Facility: CLINIC | Age: 69
End: 2024-10-19
Payer: COMMERCIAL

## 2024-10-24 DIAGNOSIS — M05.79 RHEUMATOID ARTHRITIS INVOLVING MULTIPLE SITES WITH POSITIVE RHEUMATOID FACTOR (MULTI): Primary | ICD-10-CM

## 2024-10-29 ENCOUNTER — LAB (OUTPATIENT)
Dept: LAB | Facility: LAB | Age: 69
End: 2024-10-29
Payer: COMMERCIAL

## 2024-10-29 DIAGNOSIS — M05.79 RHEUMATOID ARTHRITIS INVOLVING MULTIPLE SITES WITH POSITIVE RHEUMATOID FACTOR (MULTI): ICD-10-CM

## 2024-10-29 LAB — CRP SERPL-MCNC: 0.26 MG/DL

## 2024-10-29 PROCEDURE — 86140 C-REACTIVE PROTEIN: CPT

## 2024-10-29 PROCEDURE — 36415 COLL VENOUS BLD VENIPUNCTURE: CPT

## 2024-11-05 ENCOUNTER — APPOINTMENT (OUTPATIENT)
Dept: RHEUMATOLOGY | Facility: CLINIC | Age: 69
End: 2024-11-05
Payer: COMMERCIAL

## 2024-11-05 VITALS
BODY MASS INDEX: 22.07 KG/M2 | SYSTOLIC BLOOD PRESSURE: 142 MMHG | HEIGHT: 67 IN | WEIGHT: 140.6 LBS | OXYGEN SATURATION: 98 % | TEMPERATURE: 99.7 F | DIASTOLIC BLOOD PRESSURE: 86 MMHG | HEART RATE: 80 BPM | RESPIRATION RATE: 16 BRPM

## 2024-11-05 DIAGNOSIS — Z78.0 POSTMENOPAUSAL ESTROGEN DEFICIENCY: ICD-10-CM

## 2024-11-05 DIAGNOSIS — M05.79 RHEUMATOID ARTHRITIS INVOLVING MULTIPLE SITES WITH POSITIVE RHEUMATOID FACTOR (MULTI): Primary | ICD-10-CM

## 2024-11-05 PROCEDURE — 3079F DIAST BP 80-89 MM HG: CPT | Performed by: INTERNAL MEDICINE

## 2024-11-05 PROCEDURE — 3008F BODY MASS INDEX DOCD: CPT | Performed by: INTERNAL MEDICINE

## 2024-11-05 PROCEDURE — 99214 OFFICE O/P EST MOD 30 MIN: CPT | Performed by: INTERNAL MEDICINE

## 2024-11-05 PROCEDURE — 1160F RVW MEDS BY RX/DR IN RCRD: CPT | Performed by: INTERNAL MEDICINE

## 2024-11-05 PROCEDURE — 1036F TOBACCO NON-USER: CPT | Performed by: INTERNAL MEDICINE

## 2024-11-05 PROCEDURE — 1159F MED LIST DOCD IN RCRD: CPT | Performed by: INTERNAL MEDICINE

## 2024-11-05 PROCEDURE — 3077F SYST BP >= 140 MM HG: CPT | Performed by: INTERNAL MEDICINE

## 2024-11-05 ASSESSMENT — PATIENT HEALTH QUESTIONNAIRE - PHQ9
2. FEELING DOWN, DEPRESSED OR HOPELESS: NOT AT ALL
SUM OF ALL RESPONSES TO PHQ9 QUESTIONS 1 AND 2: 0
1. LITTLE INTEREST OR PLEASURE IN DOING THINGS: NOT AT ALL

## 2024-12-18 ENCOUNTER — HOSPITAL ENCOUNTER (OUTPATIENT)
Dept: RADIOLOGY | Facility: CLINIC | Age: 69
Discharge: HOME | End: 2024-12-18
Payer: COMMERCIAL

## 2024-12-18 DIAGNOSIS — Z78.0 POSTMENOPAUSAL ESTROGEN DEFICIENCY: ICD-10-CM

## 2024-12-18 PROCEDURE — 77080 DXA BONE DENSITY AXIAL: CPT

## 2024-12-18 PROCEDURE — 77080 DXA BONE DENSITY AXIAL: CPT | Performed by: RADIOLOGY

## 2024-12-28 ENCOUNTER — TELEPHONE (OUTPATIENT)
Dept: PRIMARY CARE | Facility: CLINIC | Age: 69
End: 2024-12-28
Payer: COMMERCIAL

## 2024-12-28 NOTE — TELEPHONE ENCOUNTER
Eye exam was done December 19, 2024 by Dr. Mak at retina [facility].  Includes OCT.  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  She has early drusen.  She was cleared to continue hydroxychloroquine.  She was advised to follow-up with Dr. Mak in 6 months.  Dr. Puente

## 2025-03-03 ENCOUNTER — APPOINTMENT (OUTPATIENT)
Dept: RHEUMATOLOGY | Facility: CLINIC | Age: 70
End: 2025-03-03
Payer: COMMERCIAL

## 2025-03-03 VITALS
TEMPERATURE: 99.5 F | BODY MASS INDEX: 21.97 KG/M2 | WEIGHT: 140 LBS | HEIGHT: 67 IN | RESPIRATION RATE: 14 BRPM | OXYGEN SATURATION: 98 % | SYSTOLIC BLOOD PRESSURE: 134 MMHG | DIASTOLIC BLOOD PRESSURE: 86 MMHG | HEART RATE: 81 BPM

## 2025-03-03 DIAGNOSIS — M05.79 RHEUMATOID ARTHRITIS INVOLVING MULTIPLE SITES WITH POSITIVE RHEUMATOID FACTOR (MULTI): ICD-10-CM

## 2025-03-03 DIAGNOSIS — M79.641 PAIN OF RIGHT HAND: ICD-10-CM

## 2025-03-03 DIAGNOSIS — T45.1X5A CHEMOTHERAPY-INDUCED NEUROPATHY (MULTI): ICD-10-CM

## 2025-03-03 DIAGNOSIS — G62.0 CHEMOTHERAPY-INDUCED NEUROPATHY (MULTI): ICD-10-CM

## 2025-03-03 PROBLEM — E08.9 DIABETES MELLITUS DUE TO UNDERLYING CONDITION WITHOUT COMPLICATION, WITHOUT LONG-TERM CURRENT USE OF INSULIN (MULTI): Status: RESOLVED | Noted: 2024-01-02 | Resolved: 2025-03-03

## 2025-03-03 PROBLEM — C25.0 MALIGNANT NEOPLASM OF HEAD OF PANCREAS (MULTI): Status: RESOLVED | Noted: 2023-05-26 | Resolved: 2025-03-03

## 2025-03-03 PROCEDURE — 1125F AMNT PAIN NOTED PAIN PRSNT: CPT | Performed by: INTERNAL MEDICINE

## 2025-03-03 PROCEDURE — 1036F TOBACCO NON-USER: CPT | Performed by: INTERNAL MEDICINE

## 2025-03-03 PROCEDURE — 3079F DIAST BP 80-89 MM HG: CPT | Performed by: INTERNAL MEDICINE

## 2025-03-03 PROCEDURE — 1159F MED LIST DOCD IN RCRD: CPT | Performed by: INTERNAL MEDICINE

## 2025-03-03 PROCEDURE — 99214 OFFICE O/P EST MOD 30 MIN: CPT | Performed by: INTERNAL MEDICINE

## 2025-03-03 PROCEDURE — 1160F RVW MEDS BY RX/DR IN RCRD: CPT | Performed by: INTERNAL MEDICINE

## 2025-03-03 PROCEDURE — 3008F BODY MASS INDEX DOCD: CPT | Performed by: INTERNAL MEDICINE

## 2025-03-03 PROCEDURE — 3075F SYST BP GE 130 - 139MM HG: CPT | Performed by: INTERNAL MEDICINE

## 2025-03-03 ASSESSMENT — PAIN SCALES - GENERAL: PAINLEVEL_OUTOF10: 3

## 2025-03-03 NOTE — PATIENT INSTRUCTIONS
Start Citracal 500 mg daily.  Try to get calcium 1200 mg daily (diet plus supplements).  Xray right hand.  Follow-up in 4 months.

## 2025-03-03 NOTE — PROGRESS NOTES
Subjective   Patient ID: Beatris Ponce is a 69 y.o. female who presents for follow up regarding RA.    HPI 70 yo F here for f/u re: RA. (Onset of symptoms was 2006-rheumatoid factor negative, Citrulline antibody 41, EMA 1:160 with negative panel), CRP 1.8, ESR 32,  She started  mg daily 4/26/24.     She is doing well with her arthritis and has not had any recent flares.    [facility], which included OCT.  There was no evidence of hydroxychloroquine retinopathy.  She has stable drusen.  She is getting eye exams every 6 months.    She had a mechanical fall March 1, 2025 when she tripped over a curb.  She has abrasions on the palms of both hands as well as over the dorsum of her right index finger PIP joint.  She has an abrasion over her left knee.  She notes some pain in the right index finger, especially at the PIP joint where she has an abrasion.    She had influenza A 1/31/25- was treated with Tamiflu.    She c/o numbness in feet- related to chemotherapy.     She had a tooth extracted- will get implant in 3 months.     She developed rash on her distal lower extremities 6/09/24.   She saw a nurse practitioner in her primary care office.  The nurse did not think the lesions were palpable.  The lesions were only located on her anterior distal legs, distal to the knees.  There was no associated itching or pain.  Rash resolved without treatment.  ANCA and cryoglobulins were negative 6/28, urinalysis was also normal.    She had a rash on her back on the back of her neck January 2, 2025.  It did itch.  Most of it has resolved.  She is going to see her dermatologist March 31, 2025.  She denies any change in soap or detergents.  She treated it with topical triamcinolone 0.1% cream.     She has chronic diarrhea since her Whipple procedure October 2023.  She has chronic numbness in her feet since she completed her chemotherapy February 2024.  She gets occasional loose stool.  They are trying to wean creon.  She also takes omeprazole 20 mh 2x daily for gerd.    CT of abdomen/ pelvis, and chest CT were without change 6/24/24.     She haD follow-up scheduled with her oncologist and her surgeon July 2, 2024.  CT scans at the end of June 2024 showed no change.     She was diagnosed with adenocarcinoma of the pancreas June 2023. She is followed by Dr. Davidson in oncology.  TREATMENT SUMMARY:  1-Neoadjuvant FOLFIRINOX x 3 months: completed 9/8/2023   2-Whipple procedure (Dr Clements, 10/6/2023), yT2N1  3-Adjuvant FOLFIRINOX: started 11/29/2023, completed 2/9/2024  4-Current treatment: surveillance     She continues with Restasis, refresh drops, and refresh gel at bedtime for treatment of dry eyes.  She has been off methotrexate since June 2023.     She had a colonoscopy last 10/21, was found to have 4 polyps. 3-year follow-up was recommended.  Her maternal uncle had colon cancer.  She had another colonoscopy July 30, 2024.  She did have a sessile serrated polyp which was removed.     DEXA 7/22: T score -1.2 left total hip, T score -1.2 left femoral neck, lumbar spine T score normal  DEXA 11/24: T-score -2.1 left total hip, T-score -1.7 left femoral neck, T-score -1.3 lumbar spine.  Estimated 10-year fracture risk by FRAX is 15.1% for major osteoporotic fracture and 2.2% for hip fracture    Labs April 2024: CMP normal except alkaline phosphatase 534 (), AST 84 (13-35), ALT 94 (7-38), CBC normal, CA 19-9 normal, magnesium 1.9, urine microalbumin negative  Labs June 2024: ESR 8, CRP 0.25 (normal less than 1), hep B surface antigen negative, hep B surface antibody nonreactive, EMA pending, GIULIANA panel negative, C3 and C4 normal, IgA normal, urinalysis normal, CBC normal except hemoglobin 11.4 hematocrit 34.9,, CMP normal except AST 42 (normal 13-35), ALT 50 (normal 7-38), alkaline phosphatase 308 (normal 34-1 23), SPEP normal,  ASO titer negative, cryos negative, ANCA negative  Labs 7/24: CBC  "normal except hemglobin 11 and hematocrit 33.7, CMP normal except alkaline phosphatase 218, AST 44 (9-39), creatinine 1.96 (GFR 58), CRP less than 0.1 (less than 1),   Labs 10/24: CRP 0.26, CMP normal except AST 56 (13-35), ALT 44 (7-38), alkaline phosphatase 196, glucose 106, cholesterol 169, HDL 67, LDL 88, triglycerides 68, 25-hydroxy vitamin D 29.1 hemoglobin A1c 6.0,  Labs November 2024: CMP normal except alkaline phosphatase 199, CBC normal except hemoglobin 11.3 and hematocrit 33.9, CA 19-9 normal.     Health maintenance:   -Prevnar 20 October 2022   -Pfizer COVID vaccine 3/21, 4/21, 8/21, 4/22   - BV Pfizer COVID booster 10/22    Review of Systems  General: Denies fevers or chills.  CV: Denies chest pain or palpitations.  Denies leg edema.  Lungs: Denies coughing or shortness of breath.  Skin: Denies rashes or nodules.  MS: Denies joint pain or joint swelling, except right index finger (see history of present illness)    Objective   Visit Vitals  /86 (BP Location: Left arm, Patient Position: Sitting, BP Cuff Size: Adult)   Pulse 81   Temp 37.5 °C (99.5 °F) (Skin)   Resp 14   Ht 1.702 m (5' 7\")   Wt 63.5 kg (140 lb)   SpO2 98%   BMI 21.93 kg/m²   OB Status Postmenopausal   Smoking Status Never   BSA 1.73 m²       Physical Exam  HEENT: PERRL, EOMI  Neck: Supple, no nodes.  CV: RRR, no MGR.  Lungs: Clear, no rales or wheezes.  Abdomen: Soft, nontender. No hepatosplenomegaly.  Extremities:  No cyanosis, clubbing, or edema.  MS: Swollen: 0         Tender: 0         Patient global: 1         Evaluator global: 1          CDAI: 2 (remission)          Mild swelling right index finger at the MCP and PIP joints.          Mild tenderness to palpation at the PIP joint.    Skin: No rashes or nodules.  Abrasion over dorsum of right index PIP. Abrasion over left knee. Small abrasions nboth palms. Small abrasion beneath chin.    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   She had Whipple procedure October 2023.  She completed adjuvant chemotherapy 2/24.  She started hydroxychloroquine the first week of May 2024.  Currently remission by CDAI.    Elevated alkaline phosphatase-alkaline phosphatase is currently 199.  It has declined from June 2024, when it was 330.  She is going to follow-up with her oncology surgeon 3/25.     BMI-currently 21.9 and stable.    Left fifth metatarsal fracture-occurred with inversion injury to the left ankle July 29, 2024.    DEXA 11/24 shows osteopenia, with lowest T-score -2.1 in the left total hip.  Estimated 10-year fracture risk by FRAX is 15.1% for major osteoporotic fracture and 2.2% for hip fracture.  She currently has chronic diarrhea related to her Whipple procedure-I would rather try to avoid adding a bisphosphonate at present.    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 Bothers at night, not during the day.    Right index finger sprain-had mechanical fall 3/01/25.  X-ray ordered of right hand to make sure there is no fracture.     Plan:  Start Citracal 500 mg daily.  Try to get calcium 1200 mg daily (diet plus supplements).  Xray right hand.  Follow-up in 4 months.

## 2025-05-06 DIAGNOSIS — M05.79 RHEUMATOID ARTHRITIS INVOLVING MULTIPLE SITES WITH POSITIVE RHEUMATOID FACTOR (MULTI): ICD-10-CM

## 2025-05-06 RX ORDER — HYDROXYCHLOROQUINE SULFATE 200 MG/1
300 TABLET, FILM COATED ORAL DAILY
Qty: 45 TABLET | Refills: 5 | Status: SHIPPED | OUTPATIENT
Start: 2025-05-06

## 2025-07-03 ENCOUNTER — APPOINTMENT (OUTPATIENT)
Dept: RHEUMATOLOGY | Facility: CLINIC | Age: 70
End: 2025-07-03
Payer: COMMERCIAL

## 2025-07-03 VITALS
BODY MASS INDEX: 22.44 KG/M2 | OXYGEN SATURATION: 99 % | SYSTOLIC BLOOD PRESSURE: 144 MMHG | WEIGHT: 143 LBS | TEMPERATURE: 98.5 F | HEIGHT: 67 IN | RESPIRATION RATE: 14 BRPM | DIASTOLIC BLOOD PRESSURE: 78 MMHG | HEART RATE: 75 BPM

## 2025-07-03 DIAGNOSIS — M05.79 RHEUMATOID ARTHRITIS INVOLVING MULTIPLE SITES WITH POSITIVE RHEUMATOID FACTOR (MULTI): Primary | ICD-10-CM

## 2025-07-03 PROCEDURE — 1160F RVW MEDS BY RX/DR IN RCRD: CPT | Performed by: INTERNAL MEDICINE

## 2025-07-03 PROCEDURE — 3078F DIAST BP <80 MM HG: CPT | Performed by: INTERNAL MEDICINE

## 2025-07-03 PROCEDURE — 1126F AMNT PAIN NOTED NONE PRSNT: CPT | Performed by: INTERNAL MEDICINE

## 2025-07-03 PROCEDURE — 1036F TOBACCO NON-USER: CPT | Performed by: INTERNAL MEDICINE

## 2025-07-03 PROCEDURE — 3077F SYST BP >= 140 MM HG: CPT | Performed by: INTERNAL MEDICINE

## 2025-07-03 PROCEDURE — 1159F MED LIST DOCD IN RCRD: CPT | Performed by: INTERNAL MEDICINE

## 2025-07-03 PROCEDURE — 99214 OFFICE O/P EST MOD 30 MIN: CPT | Performed by: INTERNAL MEDICINE

## 2025-07-03 PROCEDURE — 3008F BODY MASS INDEX DOCD: CPT | Performed by: INTERNAL MEDICINE

## 2025-07-03 RX ORDER — MULTIVIT-MINERALS/FOLIC ACID 200 MCG
1 TABLET,CHEWABLE ORAL 2 TIMES DAILY
COMMUNITY
Start: 2025-04-23

## 2025-07-03 RX ORDER — MULTIVIT-MINERALS/FOLIC ACID 80 MCG
TABLET,CHEWABLE ORAL
COMMUNITY

## 2025-07-03 ASSESSMENT — PAIN SCALES - GENERAL: PAINLEVEL_OUTOF10: 0-NO PAIN

## 2025-07-03 NOTE — PROGRESS NOTES
Subjective   Patient ID: Beatris Ponce is a 70 y.o. female who presents for follow up regarding RA.    HPI 71 yo F here for f/u re: RA. (Onset of symptoms was 2006-rheumatoid factor negative, Citrulline antibody 41, EMA 1:160 with negative panel), CRP 1.8, ESR 32,  She started  mg daily 4/26/24.     She is doing well with her arthritis and has not had any recent flares.    She had eye exam at retina [facility] December 2024, which included OCT.  There was no evidence of hydroxychloroquine retinopathy.  She has stable drusen.  She had another eye exam May 30, 2025, also had an eye exam at retina [facility] July 1, 2025.  She was told that eye exam was stable.    She had a mechanical fall March 1, 2025 when she tripped over a curb.  She has abrasions on the palms of both hands as well as over the dorsum of her right index finger PIP joint.  She has an abrasion over her left knee.  She had some pain in the right index finger, especially at the PIP joint where she had an abrasion.  [facility] showed no evidence of fracture, she was told she had a sprain.    She had an episode of significant belching which started after Easter.  She was put on pantoprazole 40 mg daily for 1 month, now is back on omeprazole 20 mg 2x daily.  She resumed Creon and is also taking a probiotic.  Symptoms have subsided.    She stepped on something when walking to the door for home May 2025.  She experienced pain at the base of the fifth metatarsal when she stepped on the object, she is not sure what the object was.  She was having significant pain.  She now has minimal pain, which she feels first in the morning when she gets up.  When she walks in the foot for about 1 minute the pain resolves.    She did officially retire May 2025.    She c/o numbness in feet- related to chemotherapy.     She had a tooth extracted- will get implant in 3 months.     CT of abdomen/ pelvis, and chest CT were without change  6/24/24.  CT of abdomen pelvis 3/25 showed stable changes from Whipple procedure.  Also noted was nonobstructive bilateral nephrolithiasis.  CT of chest March 2025 showed interval resolution of bilateral groundglass opacities, and no evidence of metastatic disease.     She haD follow-up scheduled with her oncologist and her surgeon July 2, 2024.     She was diagnosed with adenocarcinoma of the pancreas June 2023. She is followed by Dr. Davidson in oncology.  TREATMENT SUMMARY:  1-Neoadjuvant FOLFIRINOX x 3 months: completed 9/8/2023   2-Whipple procedure (Dr Clements, 10/6/2023), yT2N1  3-Adjuvant FOLFIRINOX: started 11/29/2023, completed 2/9/2024  4-Current treatment: surveillance     She continues with Restasis, refresh drops, and refresh gel at bedtime for treatment of dry eyes.  She has been off methotrexate since June 2023.     She had a colonoscopy last 10/21, was found to have 4 polyps. 3-year follow-up was recommended.  Her maternal uncle had colon cancer.  She had another colonoscopy July 30, 2024.  She did have a sessile serrated polyp which was removed.     DEXA 7/22: T score -1.2 left total hip, T score -1.2 left femoral neck, lumbar spine T score normal  DEXA 11/24: T-score -2.1 left total hip, T-score -1.7 left femoral neck, T-score -1.3 lumbar spine.  Estimated 10-year fracture risk by FRAX is 15.1% for major osteoporotic fracture and 2.2% for hip fracture    Labs 7/24: CBC normal except hemglobin 11 and hematocrit 33.7, CMP normal except alkaline phosphatase 218, AST 44 (9-39), creatinine 1.96 (GFR 58), CRP less than 0.1 (less than 1),   Labs 10/24: CRP 0.26, CMP normal except AST 56 (13-35), ALT 44 (7-38), alkaline phosphatase 196, glucose 106, cholesterol 169, HDL 67, LDL 88, triglycerides 68, 25-hydroxy vitamin D 29.1 hemoglobin A1c 6.0,  Labs November 2024: CMP normal except alkaline phosphatase 199, CBC normal except hemoglobin 11.3 and hematocrit 33.9, CA 19-9 normal.  Labs April 2025: CMP normal  "except alkaline phosphatase 189 (normal 34-1 23), AST 65 (normal 13-35), ALT 59 (normal 7-38), glucose 106, hemoglobin A1c 5.9, 25-hydroxy vitamin D 29, CBC normal, cholesterol 171, HDL 61, LDL 86, triglycerides 120  Labs May 2025: Hepatic function panel normal except alkaline phosphatase 147 (normal )     Health maintenance:   -Prevnar 20 October 2022   -Pfizer COVID vaccine 3/21, 4/21, 8/21, 4/22   - BV Pfizer COVID booster 10/22    Review of Systems  General: Denies fevers or chills.  CV: Denies chest pain or palpitations.  Denies leg edema.  Lungs: Denies coughing or shortness of breath.  Skin: Denies rashes or nodules.  MS: Denies joint pain or joint swelling, except right index finger (see history of present illness)    Objective   Visit Vitals  /78 (BP Location: Left arm, Patient Position: Sitting, BP Cuff Size: Adult)   Pulse 75   Temp 36.9 °C (98.5 °F)   Resp 14   Ht 1.702 m (5' 7\")   Wt 64.9 kg (143 lb)   SpO2 99%   BMI 22.40 kg/m²   OB Status Postmenopausal   Smoking Status Never   BSA 1.75 m²       Physical Exam  HEENT: PERRL, EOMI  Neck: Supple, no nodes.  CV: RRR, no MGR.  Lungs: Clear, no rales or wheezes.  Abdomen: Soft, nontender. No hepatosplenomegaly.  Extremities:  No cyanosis, clubbing, or edema.  MS: Swollen: 0         Tender: 0         Patient global: 1         Evaluator global: 1          CDAI: 2 (remission)            Skin: No rashes or nodules.      Assessment/Plan     Problem List Items Addressed This Visit           ICD-10-CM    Rheumatoid arthritis - Primary M06.9    BMI 22.0-22.9, adult Z68.22       Seropositive rheumatoid arthritis-onset 2006 (CCP 41, RF negative, EMA 1:160 with negative panel, CRP 1.8, ESR 32)-she was on methotrexate until diagnosed with pancreatic cancer June 2023.   She had Whipple procedure October 2023.  She completed adjuvant chemotherapy 2/24.  She started hydroxychloroquine the first week of May 2024.  Currently remission by CDAI.    Elevated " alkaline phosphatase-alkaline phosphatase is currently 147.  It has declined from June 2024, when it was 330.       BMI-currently 22 and stable.    Left fifth metatarsal fracture-occurred with inversion injury to the left ankle July 29, 2024.    DEXA 11/24 shows osteopenia, with lowest T-score -2.1 in the left total hip.  Estimated 10-year fracture risk by FRAX is 15.1% for major osteoporotic fracture and 2.2% for hip fracture.  She currently has chronic diarrhea related to her Whipple procedure-I would rather try to avoid adding a bisphosphonate at present.    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 Bothers at night, not during the day.    Asymptomatic nephrolithiasis-I recommend that she stop calcium supplement .  I recommended she try to get calcium 1200 mg daily from her diet .    Hypertension-above goal.  She had forgotten to take her amlodipine this morning but will take it when she goes home.    Plan:  Stop calcium supplement.  Try to get calcium 1200 mg daily from diet.  Follow-up in 4 months.

## 2025-08-06 ENCOUNTER — HOSPITAL ENCOUNTER (OUTPATIENT)
Dept: RADIOLOGY | Facility: CLINIC | Age: 70
Discharge: HOME | End: 2025-08-06
Payer: MEDICARE

## 2025-08-06 DIAGNOSIS — Z12.31 ENCOUNTER FOR SCREENING MAMMOGRAM FOR MALIGNANT NEOPLASM OF BREAST: ICD-10-CM

## 2025-08-06 PROCEDURE — 77067 SCR MAMMO BI INCL CAD: CPT | Performed by: RADIOLOGY

## 2025-08-06 PROCEDURE — 77067 SCR MAMMO BI INCL CAD: CPT

## 2025-08-06 PROCEDURE — 77063 BREAST TOMOSYNTHESIS BI: CPT | Performed by: RADIOLOGY

## 2025-11-04 ENCOUNTER — APPOINTMENT (OUTPATIENT)
Dept: RHEUMATOLOGY | Facility: CLINIC | Age: 70
End: 2025-11-04
Payer: COMMERCIAL